# Patient Record
Sex: MALE | Race: WHITE | NOT HISPANIC OR LATINO | URBAN - NONMETROPOLITAN AREA
[De-identification: names, ages, dates, MRNs, and addresses within clinical notes are randomized per-mention and may not be internally consistent; named-entity substitution may affect disease eponyms.]

---

## 2017-02-13 ENCOUNTER — OFFICE (OUTPATIENT)
Dept: URBAN - NONMETROPOLITAN AREA CLINIC 13 | Facility: CLINIC | Age: 42
End: 2017-02-13

## 2017-02-13 VITALS
DIASTOLIC BLOOD PRESSURE: 88 MMHG | HEIGHT: 69 IN | SYSTOLIC BLOOD PRESSURE: 143 MMHG | WEIGHT: 141 LBS | HEART RATE: 87 BPM

## 2017-02-13 VITALS — HEIGHT: 69 IN

## 2017-02-13 DIAGNOSIS — Z83.71 FAMILY HISTORY OF COLONIC POLYPS: ICD-10-CM

## 2017-02-13 DIAGNOSIS — R10.12 LEFT UPPER QUADRANT PAIN: ICD-10-CM

## 2017-02-13 DIAGNOSIS — K21.9 GASTRO-ESOPHAGEAL REFLUX DISEASE WITHOUT ESOPHAGITIS: ICD-10-CM

## 2017-02-13 DIAGNOSIS — K59.03 DRUG INDUCED CONSTIPATION: ICD-10-CM

## 2017-02-13 DIAGNOSIS — K92.1 MELENA: ICD-10-CM

## 2017-02-13 LAB
CBC EMERALD: HEMATOCRIT: 38.2 % (ref 37–47)
CBC EMERALD: HEMOGLOBIN: 12.7 G/DL — LOW (ref 14–18)
CBC EMERALD: LYMPHS %: 27.4 % (ref 20.5–40.5)
CBC EMERALD: LYMPHS ABSOLUTE: 1.9 10*3U/L (ref 1.3–2.9)
CBC EMERALD: MCH: 31.5 PG (ref 27–32)
CBC EMERALD: MCHC: 33.2 G/DL (ref 32–35)
CBC EMERALD: MCV: 94.8 FL (ref 84–100)
CBC EMERALD: MONOS %: 6.7 % (ref 2–10)
CBC EMERALD: MONOS ABSOLUTE: 0.5 10*3U/L (ref 0.3–3.8)
CBC EMERALD: MPV: 8.8 FL (ref 7.4–10.4)
CBC EMERALD: NEUT. %: 65.9 % — HIGH (ref 43–65)
CBC EMERALD: NEUT. ABSOLUTE: 4.5 10*3U/L (ref 2.2–4.8)
CBC EMERALD: PLATELETS: 295 10*3U/L (ref 130–440)
CBC EMERALD: RBC: 4 10*6U/L — LOW (ref 4.2–5.4)
CBC EMERALD: RDW: 12 % (ref 11.5–15.5)
CBC EMERALD: WBC: 6.8 10*3U/L (ref 4.5–10.5)

## 2017-02-13 PROCEDURE — 99203 OFFICE O/P NEW LOW 30 MIN: CPT | Mod: 25

## 2017-02-13 PROCEDURE — 76700 US EXAM ABDOM COMPLETE: CPT

## 2017-02-13 PROCEDURE — 85025 COMPLETE CBC W/AUTO DIFF WBC: CPT

## 2017-02-13 RX ORDER — POLYETHYLENE GLYCOL 3350, SODIUM SULFATE ANHYDROUS, SODIUM BICARBONATE, SODIUM CHLORIDE, POTASSIUM CHLORIDE 236; 22.74; 6.74; 5.86; 2.97 G/4L; G/4L; G/4L; G/4L; G/4L
POWDER, FOR SOLUTION ORAL
Qty: 1 | Refills: 0 | Status: COMPLETED
Start: 2017-02-13 | End: 2017-02-17

## 2017-02-13 RX ORDER — BISACODYL 5 MG
TABLET, DELAYED RELEASE (ENTERIC COATED) ORAL
Qty: 8 | Refills: 0 | Status: COMPLETED
Start: 2017-02-13 | End: 2017-02-17

## 2017-02-13 RX ORDER — ONDANSETRON HYDROCHLORIDE 4 MG/1
TABLET, FILM COATED ORAL
Qty: 2 | Refills: 0 | Status: COMPLETED
Start: 2017-02-13 | End: 2017-02-17

## 2017-02-13 RX ORDER — HYDROCORTISONE 25 MG/G
CREAM TOPICAL
Qty: 30 | Refills: 2 | Status: ACTIVE
Start: 2017-02-13

## 2017-02-13 RX ORDER — POLYETHYLENE GLYCOL 3350 17 G/17G
POWDER, FOR SOLUTION ORAL
Qty: 1054 | Refills: 5 | Status: COMPLETED
Start: 2017-02-13 | End: 2017-05-01

## 2017-02-17 ENCOUNTER — OFFICE (OUTPATIENT)
Dept: URBAN - NONMETROPOLITAN AREA CLINIC 13 | Facility: CLINIC | Age: 42
End: 2017-02-17

## 2017-02-17 ENCOUNTER — AMBULATORY SURGICAL CENTER (OUTPATIENT)
Dept: URBAN - NONMETROPOLITAN AREA SURGERY 2 | Facility: SURGERY | Age: 42
End: 2017-02-17
Payer: COMMERCIAL

## 2017-02-17 ENCOUNTER — AMBULATORY SURGICAL CENTER (OUTPATIENT)
Dept: URBAN - NONMETROPOLITAN AREA SURGERY 2 | Facility: SURGERY | Age: 42
End: 2017-02-17

## 2017-02-17 VITALS
SYSTOLIC BLOOD PRESSURE: 89 MMHG | OXYGEN SATURATION: 100 % | DIASTOLIC BLOOD PRESSURE: 89 MMHG | DIASTOLIC BLOOD PRESSURE: 59 MMHG | DIASTOLIC BLOOD PRESSURE: 77 MMHG | DIASTOLIC BLOOD PRESSURE: 57 MMHG | HEART RATE: 61 BPM | DIASTOLIC BLOOD PRESSURE: 79 MMHG | DIASTOLIC BLOOD PRESSURE: 64 MMHG | SYSTOLIC BLOOD PRESSURE: 129 MMHG | WEIGHT: 141 LBS | RESPIRATION RATE: 16 BRPM | HEART RATE: 59 BPM | DIASTOLIC BLOOD PRESSURE: 60 MMHG | HEART RATE: 63 BPM | OXYGEN SATURATION: 99 % | HEART RATE: 60 BPM | RESPIRATION RATE: 20 BRPM | HEIGHT: 69 IN | SYSTOLIC BLOOD PRESSURE: 133 MMHG | SYSTOLIC BLOOD PRESSURE: 120 MMHG | HEART RATE: 62 BPM | SYSTOLIC BLOOD PRESSURE: 134 MMHG | DIASTOLIC BLOOD PRESSURE: 75 MMHG | SYSTOLIC BLOOD PRESSURE: 131 MMHG | HEART RATE: 65 BPM | HEART RATE: 78 BPM | SYSTOLIC BLOOD PRESSURE: 111 MMHG

## 2017-02-17 VITALS — HEIGHT: 69 IN

## 2017-02-17 DIAGNOSIS — D12.2 BENIGN NEOPLASM OF ASCENDING COLON: ICD-10-CM

## 2017-02-17 DIAGNOSIS — R93.2 ABNORMAL FINDINGS ON DIAGNOSTIC IMAGING OF LIVER AND BILIARY: ICD-10-CM

## 2017-02-17 DIAGNOSIS — K92.1 MELENA: ICD-10-CM

## 2017-02-17 DIAGNOSIS — Z86.010 PERSONAL HISTORY OF COLONIC POLYPS: ICD-10-CM

## 2017-02-17 DIAGNOSIS — K62.89 OTHER SPECIFIED DISEASES OF ANUS AND RECTUM: ICD-10-CM

## 2017-02-17 DIAGNOSIS — R10.12 LEFT UPPER QUADRANT PAIN: ICD-10-CM

## 2017-02-17 PROBLEM — Z83.71: Status: ACTIVE | Noted: 2017-02-17

## 2017-02-17 PROCEDURE — 88305 TISSUE EXAM BY PATHOLOGIST: CPT | Mod: TC | Performed by: INTERNAL MEDICINE

## 2017-02-17 PROCEDURE — 36415 COLL VENOUS BLD VENIPUNCTURE: CPT | Performed by: INTERNAL MEDICINE

## 2017-02-17 PROCEDURE — 74170 CT ABD WO CNTRST FLWD CNTRST: CPT | Mod: TC

## 2017-02-17 PROCEDURE — 45380 COLONOSCOPY AND BIOPSY: CPT

## 2017-02-21 LAB — SURGICAL PROCEDURE: PDF REPORT: (no result)

## 2017-03-02 ENCOUNTER — AMBULATORY SURGICAL CENTER (OUTPATIENT)
Dept: URBAN - NONMETROPOLITAN AREA SURGERY 2 | Facility: SURGERY | Age: 42
End: 2017-03-02

## 2017-03-02 VITALS
OXYGEN SATURATION: 97 % | DIASTOLIC BLOOD PRESSURE: 78 MMHG | SYSTOLIC BLOOD PRESSURE: 114 MMHG | HEART RATE: 82 BPM | HEART RATE: 76 BPM | RESPIRATION RATE: 20 BRPM | SYSTOLIC BLOOD PRESSURE: 109 MMHG | OXYGEN SATURATION: 96 % | SYSTOLIC BLOOD PRESSURE: 115 MMHG | SYSTOLIC BLOOD PRESSURE: 121 MMHG | DIASTOLIC BLOOD PRESSURE: 81 MMHG | SYSTOLIC BLOOD PRESSURE: 136 MMHG | DIASTOLIC BLOOD PRESSURE: 76 MMHG | WEIGHT: 141 LBS | DIASTOLIC BLOOD PRESSURE: 73 MMHG | DIASTOLIC BLOOD PRESSURE: 68 MMHG | SYSTOLIC BLOOD PRESSURE: 117 MMHG | OXYGEN SATURATION: 99 % | DIASTOLIC BLOOD PRESSURE: 71 MMHG | RESPIRATION RATE: 17 BRPM | RESPIRATION RATE: 16 BRPM | HEIGHT: 69 IN | HEART RATE: 64 BPM | HEART RATE: 93 BPM | HEART RATE: 66 BPM | DIASTOLIC BLOOD PRESSURE: 74 MMHG | OXYGEN SATURATION: 98 % | RESPIRATION RATE: 18 BRPM | SYSTOLIC BLOOD PRESSURE: 118 MMHG | HEART RATE: 80 BPM | HEART RATE: 86 BPM | SYSTOLIC BLOOD PRESSURE: 131 MMHG

## 2017-03-02 DIAGNOSIS — K31.89 OTHER DISEASES OF STOMACH AND DUODENUM: ICD-10-CM

## 2017-03-02 PROCEDURE — 44361 SMALL BOWEL ENDOSCOPY/BIOPSY: CPT

## 2017-03-02 PROCEDURE — 00740: CPT | Mod: QS,QZ

## 2017-03-02 RX ORDER — PANTOPRAZOLE SODIUM 40 MG/1
TABLET, DELAYED RELEASE ORAL
Qty: 90 | Refills: 1 | Status: COMPLETED
End: 2018-03-28

## 2017-03-06 LAB — SURGICAL PROCEDURE: PDF REPORT: (no result)

## 2017-03-13 ENCOUNTER — OFFICE (OUTPATIENT)
Dept: URBAN - NONMETROPOLITAN AREA CLINIC 13 | Facility: CLINIC | Age: 42
End: 2017-03-13

## 2017-03-13 VITALS — HEIGHT: 69 IN

## 2017-03-13 DIAGNOSIS — K50.90 CROHN'S DISEASE, UNSPECIFIED, WITHOUT COMPLICATIONS: ICD-10-CM

## 2017-03-13 DIAGNOSIS — K52.9 NONINFECTIVE GASTROENTERITIS AND COLITIS, UNSPECIFIED: ICD-10-CM

## 2017-03-13 DIAGNOSIS — R10.12 LEFT UPPER QUADRANT PAIN: ICD-10-CM

## 2017-03-13 PROCEDURE — 74177 CT ABD & PELVIS W/CONTRAST: CPT | Mod: TC

## 2017-03-17 ENCOUNTER — OFFICE (OUTPATIENT)
Dept: URBAN - NONMETROPOLITAN AREA CLINIC 13 | Facility: CLINIC | Age: 42
End: 2017-03-17

## 2017-03-17 VITALS
HEART RATE: 87 BPM | WEIGHT: 138 LBS | HEIGHT: 69 IN | DIASTOLIC BLOOD PRESSURE: 92 MMHG | SYSTOLIC BLOOD PRESSURE: 148 MMHG

## 2017-03-17 DIAGNOSIS — K59.03 DRUG INDUCED CONSTIPATION: ICD-10-CM

## 2017-03-17 DIAGNOSIS — R10.11 RIGHT UPPER QUADRANT PAIN: ICD-10-CM

## 2017-03-17 DIAGNOSIS — K21.9 GASTRO-ESOPHAGEAL REFLUX DISEASE WITHOUT ESOPHAGITIS: ICD-10-CM

## 2017-03-17 DIAGNOSIS — R10.12 LEFT UPPER QUADRANT PAIN: ICD-10-CM

## 2017-03-17 DIAGNOSIS — K92.1 MELENA: ICD-10-CM

## 2017-03-17 PROCEDURE — 99213 OFFICE O/P EST LOW 20 MIN: CPT

## 2017-03-17 RX ORDER — CHLORDIAZEPOXIDE HYDROCHLORIDE 5 MG/1
CAPSULE ORAL
Qty: 90 | Refills: 2 | Status: COMPLETED
Start: 2017-03-17 | End: 2017-09-22

## 2017-03-17 RX ORDER — CHLORDIAZEPOXIDE HYDROCHLORIDE AND CLIDINIUM BROMIDE 5; 2.5 MG/1; MG/1
CAPSULE ORAL
Qty: 90 | Refills: 2 | Status: ACTIVE
Start: 2017-03-17

## 2017-05-01 ENCOUNTER — OFFICE (OUTPATIENT)
Dept: URBAN - NONMETROPOLITAN AREA CLINIC 13 | Facility: CLINIC | Age: 42
End: 2017-05-01
Payer: COMMERCIAL

## 2017-05-01 VITALS
SYSTOLIC BLOOD PRESSURE: 136 MMHG | HEIGHT: 69 IN | DIASTOLIC BLOOD PRESSURE: 88 MMHG | HEART RATE: 51 BPM | WEIGHT: 130 LBS

## 2017-05-01 DIAGNOSIS — K50.90 CROHN'S DISEASE, UNSPECIFIED, WITHOUT COMPLICATIONS: ICD-10-CM

## 2017-05-01 DIAGNOSIS — K21.9 GASTRO-ESOPHAGEAL REFLUX DISEASE WITHOUT ESOPHAGITIS: ICD-10-CM

## 2017-05-01 DIAGNOSIS — R63.4 ABNORMAL WEIGHT LOSS: ICD-10-CM

## 2017-05-01 PROCEDURE — 99213 OFFICE O/P EST LOW 20 MIN: CPT

## 2017-09-22 ENCOUNTER — OFFICE (OUTPATIENT)
Dept: URBAN - NONMETROPOLITAN AREA CLINIC 13 | Facility: CLINIC | Age: 42
End: 2017-09-22

## 2017-09-22 VITALS — HEIGHT: 69 IN

## 2017-09-22 VITALS
RESPIRATION RATE: 18 BRPM | HEIGHT: 69 IN | SYSTOLIC BLOOD PRESSURE: 142 MMHG | DIASTOLIC BLOOD PRESSURE: 93 MMHG | HEART RATE: 81 BPM | WEIGHT: 130 LBS

## 2017-09-22 DIAGNOSIS — K21.9 GASTRO-ESOPHAGEAL REFLUX DISEASE WITHOUT ESOPHAGITIS: ICD-10-CM

## 2017-09-22 DIAGNOSIS — K92.1 MELENA: ICD-10-CM

## 2017-09-22 DIAGNOSIS — R19.7 DIARRHEA, UNSPECIFIED: ICD-10-CM

## 2017-09-22 DIAGNOSIS — R93.2 ABNORMAL FINDINGS ON DIAGNOSTIC IMAGING OF LIVER AND BILIARY: ICD-10-CM

## 2017-09-22 DIAGNOSIS — R10.12 LEFT UPPER QUADRANT PAIN: ICD-10-CM

## 2017-09-22 DIAGNOSIS — R10.32 LEFT LOWER QUADRANT PAIN: ICD-10-CM

## 2017-09-22 DIAGNOSIS — R63.4 ABNORMAL WEIGHT LOSS: ICD-10-CM

## 2017-09-22 LAB
CBC EMERALD: HEMATOCRIT: 41.7 % (ref 37–47)
CBC EMERALD: HEMOGLOBIN: 13.6 G/DL — LOW (ref 14–18)
CBC EMERALD: LYMPHS %: 27.7 % (ref 20.5–40.5)
CBC EMERALD: LYMPHS ABSOLUTE: 1.2 10*3U/L — LOW (ref 1.3–2.9)
CBC EMERALD: MCH: 31.6 PG (ref 27–32)
CBC EMERALD: MCHC: 32.6 G/DL (ref 28.5–35)
CBC EMERALD: MCV: 97 FL (ref 84–100)
CBC EMERALD: MONOS %: 10.6 % — HIGH (ref 2–10)
CBC EMERALD: MONOS ABSOLUTE: 0.5 10*3U/L (ref 0.3–3.8)
CBC EMERALD: MPV: 10.9 FL — HIGH (ref 7.4–10.4)
CBC EMERALD: NEUT. %: 61.7 % (ref 43–65)
CBC EMERALD: NEUT. ABSOLUTE: 2.7 10*3U/L (ref 2.2–4.8)
CBC EMERALD: PLATELETS: 300 10*3U/L (ref 130–440)
CBC EMERALD: RBC: 4.3 10*6U/L (ref 4.2–5.4)
CBC EMERALD: RDW: 13.1 % (ref 11.5–15.5)
CBC EMERALD: WBC: 4.4 10*3U/L — LOW (ref 4.5–10.5)

## 2017-09-22 PROCEDURE — 99214 OFFICE O/P EST MOD 30 MIN: CPT

## 2017-09-22 PROCEDURE — 85025 COMPLETE CBC W/AUTO DIFF WBC: CPT

## 2017-09-22 RX ORDER — PANTOPRAZOLE SODIUM 40 MG/1
TABLET, DELAYED RELEASE ORAL
Qty: 90 | Refills: 1 | Status: COMPLETED
End: 2018-03-28

## 2017-09-22 RX ORDER — HYDROCORTISONE 25 MG/G
CREAM TOPICAL
Qty: 30 | Refills: 2 | Status: ACTIVE
Start: 2017-02-13

## 2017-10-06 ENCOUNTER — OFFICE (OUTPATIENT)
Dept: URBAN - NONMETROPOLITAN AREA CLINIC 13 | Facility: CLINIC | Age: 42
End: 2017-10-06

## 2017-10-06 VITALS — HEIGHT: 69 IN

## 2017-10-06 DIAGNOSIS — R10.11 RIGHT UPPER QUADRANT PAIN: ICD-10-CM

## 2017-10-06 PROCEDURE — 74170 CT ABD WO CNTRST FLWD CNTRST: CPT | Mod: TC

## 2017-11-22 ENCOUNTER — OFFICE (OUTPATIENT)
Dept: URBAN - NONMETROPOLITAN AREA CLINIC 13 | Facility: CLINIC | Age: 42
End: 2017-11-22

## 2017-11-22 VITALS
HEIGHT: 69 IN | HEART RATE: 85 BPM | SYSTOLIC BLOOD PRESSURE: 155 MMHG | WEIGHT: 136 LBS | DIASTOLIC BLOOD PRESSURE: 88 MMHG

## 2017-11-22 VITALS — HEIGHT: 69 IN

## 2017-11-22 DIAGNOSIS — R19.7 DIARRHEA, UNSPECIFIED: ICD-10-CM

## 2017-11-22 DIAGNOSIS — K21.9 GASTRO-ESOPHAGEAL REFLUX DISEASE WITHOUT ESOPHAGITIS: ICD-10-CM

## 2017-11-22 DIAGNOSIS — R10.12 LEFT UPPER QUADRANT PAIN: ICD-10-CM

## 2017-11-22 DIAGNOSIS — K90.0 CELIAC DISEASE: ICD-10-CM

## 2017-11-22 LAB
CBC SYSMEX: HEMATOCRIT: 41 % (ref 37–47)
CBC SYSMEX: HEMOGLOBIN: 13.3 G/DL — LOW (ref 14–18)
CBC SYSMEX: LYMPHS %: 27.1 % (ref 20.5–40.5)
CBC SYSMEX: LYMPHS ABSOLUTE: 1.4 10*3U/L (ref 1.3–2.9)
CBC SYSMEX: MCH: 31.5 PG (ref 27–32)
CBC SYSMEX: MCHC: 32.4 G/DL (ref 28.5–35)
CBC SYSMEX: MCV: 97.2 FL (ref 84–100)
CBC SYSMEX: MONOS %: 9.3 % (ref 2–10)
CBC SYSMEX: MONOS ABSOLUTE: 0.5 10*3U/L (ref 0.3–3.8)
CBC SYSMEX: MPV: 10.5 FL — HIGH (ref 7.4–10.4)
CBC SYSMEX: NEUT. %: 63.6 % (ref 43–65)
CBC SYSMEX: NEUT. ABSOLUTE: 3.3 10*3U/L (ref 2.2–4.8)
CBC SYSMEX: PLATELETS: 286 10*3U/L (ref 130–440)
CBC SYSMEX: RBC: 4.2 10*6U/L (ref 4.2–5.4)
CBC SYSMEX: RDW: 13 % (ref 11.5–15.5)
CBC SYSMEX: WBC: 5.2 10*3U/L (ref 4.5–10.5)

## 2017-11-22 PROCEDURE — 85025 COMPLETE CBC W/AUTO DIFF WBC: CPT | Performed by: NURSE PRACTITIONER

## 2017-11-22 PROCEDURE — 99213 OFFICE O/P EST LOW 20 MIN: CPT | Performed by: NURSE PRACTITIONER

## 2018-03-28 ENCOUNTER — OFFICE (OUTPATIENT)
Dept: URBAN - NONMETROPOLITAN AREA CLINIC 13 | Facility: CLINIC | Age: 43
End: 2018-03-28

## 2018-03-28 VITALS
SYSTOLIC BLOOD PRESSURE: 144 MMHG | SYSTOLIC BLOOD PRESSURE: 157 MMHG | HEART RATE: 77 BPM | DIASTOLIC BLOOD PRESSURE: 90 MMHG | WEIGHT: 141 LBS | HEIGHT: 69 IN | DIASTOLIC BLOOD PRESSURE: 94 MMHG

## 2018-03-28 DIAGNOSIS — K90.0 CELIAC DISEASE: ICD-10-CM

## 2018-03-28 DIAGNOSIS — R19.7 DIARRHEA, UNSPECIFIED: ICD-10-CM

## 2018-03-28 PROCEDURE — 99212 OFFICE O/P EST SF 10 MIN: CPT | Performed by: NURSE PRACTITIONER

## 2019-01-22 ENCOUNTER — OFFICE (OUTPATIENT)
Dept: URBAN - NONMETROPOLITAN AREA CLINIC 13 | Facility: CLINIC | Age: 44
End: 2019-01-22

## 2019-01-22 VITALS
SYSTOLIC BLOOD PRESSURE: 178 MMHG | SYSTOLIC BLOOD PRESSURE: 171 MMHG | DIASTOLIC BLOOD PRESSURE: 85 MMHG | HEIGHT: 69 IN | DIASTOLIC BLOOD PRESSURE: 101 MMHG | WEIGHT: 143 LBS | HEART RATE: 77 BPM | OXYGEN SATURATION: 97 %

## 2019-01-22 VITALS — HEIGHT: 69 IN

## 2019-01-22 DIAGNOSIS — K59.00 CONSTIPATION, UNSPECIFIED: ICD-10-CM

## 2019-01-22 DIAGNOSIS — K21.9 GASTRO-ESOPHAGEAL REFLUX DISEASE WITHOUT ESOPHAGITIS: ICD-10-CM

## 2019-01-22 DIAGNOSIS — Z86.010 PERSONAL HISTORY OF COLONIC POLYPS: ICD-10-CM

## 2019-01-22 DIAGNOSIS — R10.32 LEFT LOWER QUADRANT PAIN: ICD-10-CM

## 2019-01-22 LAB
CBC SYSMEX: HEMATOCRIT: 42.6 % (ref 37–47)
CBC SYSMEX: HEMOGLOBIN: 13.9 G/DL — LOW (ref 14–18)
CBC SYSMEX: LYMPHS %: 11.8 % — LOW (ref 20.5–40.5)
CBC SYSMEX: LYMPHS ABSOLUTE: 0.7 10*3U/L — LOW (ref 1.3–2.9)
CBC SYSMEX: MCH: 32.4 PG — HIGH (ref 27–32)
CBC SYSMEX: MCHC: 32.6 G/DL (ref 28.5–35)
CBC SYSMEX: MCV: 99.3 FL (ref 84–100)
CBC SYSMEX: MONOS %: 4.2 % (ref 2–10)
CBC SYSMEX: MONOS ABSOLUTE: 0.3 10*3U/L (ref 0.3–3.8)
CBC SYSMEX: MPV: 10.6 FL (ref 8.3–11.9)
CBC SYSMEX: NEUT. %: 84 % — HIGH (ref 43–67)
CBC SYSMEX: NEUT. ABSOLUTE: 5.3 10*3U/L — HIGH (ref 2.2–4.8)
CBC SYSMEX: PLATELETS: 374 10*3U/L (ref 130–440)
CBC SYSMEX: RBC: 4.3 10*6U/L (ref 4.2–5.4)
CBC SYSMEX: RDW: 13.3 % (ref 11.5–15.5)
CBC SYSMEX: WBC: 6.3 10*3U/L (ref 4.5–10.5)

## 2019-01-22 PROCEDURE — 36415 COLL VENOUS BLD VENIPUNCTURE: CPT | Performed by: NURSE PRACTITIONER

## 2019-01-22 PROCEDURE — 99213 OFFICE O/P EST LOW 20 MIN: CPT | Performed by: NURSE PRACTITIONER

## 2019-01-22 PROCEDURE — 85025 COMPLETE CBC W/AUTO DIFF WBC: CPT | Performed by: NURSE PRACTITIONER

## 2019-01-22 RX ORDER — DOCUSATE SODIUM AND SENNOSIDES 50; 8.6 MG/1; MG/1
TABLET, FILM COATED ORAL
Qty: 60 | Refills: 3 | Status: ACTIVE
Start: 2019-01-22

## 2019-01-22 RX ORDER — OMEPRAZOLE 40 MG/1
CAPSULE, DELAYED RELEASE PELLETS ORAL
Qty: 90 | Refills: 1 | Status: ACTIVE
Start: 2019-01-22

## 2019-01-22 RX ORDER — POLYETHYLENE GLYCOL 3350 17 G/17G
POWDER, FOR SOLUTION ORAL
Qty: 3060 | Refills: 2 | Status: ACTIVE
Start: 2019-01-22

## 2019-01-25 ENCOUNTER — OFFICE (OUTPATIENT)
Dept: URBAN - NONMETROPOLITAN AREA CLINIC 13 | Facility: CLINIC | Age: 44
End: 2019-01-25

## 2019-01-25 VITALS — HEIGHT: 69 IN

## 2019-01-25 DIAGNOSIS — R10.32 LEFT LOWER QUADRANT PAIN: ICD-10-CM

## 2019-01-25 PROCEDURE — 74177 CT ABD & PELVIS W/CONTRAST: CPT | Mod: TC | Performed by: NURSE PRACTITIONER
